# Patient Record
Sex: FEMALE | Race: AMERICAN INDIAN OR ALASKA NATIVE | NOT HISPANIC OR LATINO | Employment: FULL TIME | URBAN - METROPOLITAN AREA
[De-identification: names, ages, dates, MRNs, and addresses within clinical notes are randomized per-mention and may not be internally consistent; named-entity substitution may affect disease eponyms.]

---

## 2017-06-16 ENCOUNTER — APPOINTMENT (OUTPATIENT)
Dept: RADIOLOGY | Facility: MEDICAL CENTER | Age: 52
End: 2017-06-16
Attending: EMERGENCY MEDICINE
Payer: MEDICAID

## 2017-06-16 DIAGNOSIS — M23.51 RECURRENT RIGHT KNEE INSTABILITY: ICD-10-CM

## 2017-06-16 DIAGNOSIS — M25.561 RIGHT KNEE PAIN, UNSPECIFIED CHRONICITY: ICD-10-CM

## 2017-06-16 PROCEDURE — 73721 MRI JNT OF LWR EXTRE W/O DYE: CPT | Mod: RT

## 2018-04-09 ENCOUNTER — APPOINTMENT (OUTPATIENT)
Dept: RADIOLOGY | Facility: MEDICAL CENTER | Age: 53
End: 2018-04-09
Attending: FAMILY MEDICINE
Payer: MEDICAID

## 2018-04-11 ENCOUNTER — APPOINTMENT (OUTPATIENT)
Dept: RADIOLOGY | Facility: MEDICAL CENTER | Age: 53
End: 2018-04-11
Attending: FAMILY MEDICINE
Payer: MEDICAID

## 2018-04-12 ENCOUNTER — HOSPITAL ENCOUNTER (OUTPATIENT)
Dept: RADIOLOGY | Facility: MEDICAL CENTER | Age: 53
End: 2018-04-12
Attending: FAMILY MEDICINE
Payer: MEDICAID

## 2018-04-12 DIAGNOSIS — E05.90 PRETIBIAL MYXEDEMA: ICD-10-CM

## 2018-04-12 PROCEDURE — 76536 US EXAM OF HEAD AND NECK: CPT

## 2018-04-13 ENCOUNTER — OFFICE VISIT (OUTPATIENT)
Dept: ENDOCRINOLOGY | Facility: MEDICAL CENTER | Age: 53
End: 2018-04-13
Payer: MEDICAID

## 2018-04-13 VITALS
BODY MASS INDEX: 26.82 KG/M2 | WEIGHT: 161 LBS | DIASTOLIC BLOOD PRESSURE: 90 MMHG | SYSTOLIC BLOOD PRESSURE: 142 MMHG | HEART RATE: 94 BPM | HEIGHT: 65 IN | OXYGEN SATURATION: 96 %

## 2018-04-13 DIAGNOSIS — E53.8 VITAMIN B12 DEFICIENCY: ICD-10-CM

## 2018-04-13 DIAGNOSIS — E55.9 VITAMIN D DEFICIENCY: ICD-10-CM

## 2018-04-13 DIAGNOSIS — R00.2 HEART PALPITATIONS: ICD-10-CM

## 2018-04-13 DIAGNOSIS — E05.90 HYPERTHYROIDISM: ICD-10-CM

## 2018-04-13 PROCEDURE — 99204 OFFICE O/P NEW MOD 45 MIN: CPT | Performed by: INTERNAL MEDICINE

## 2018-04-13 RX ORDER — METHIMAZOLE 10 MG/1
40 TABLET ORAL DAILY
Qty: 360 TAB | Refills: 3 | Status: SHIPPED | OUTPATIENT
Start: 2018-04-13 | End: 2018-11-28 | Stop reason: SDUPTHER

## 2018-04-13 RX ORDER — METHIMAZOLE 10 MG/1
10 TABLET ORAL 3 TIMES DAILY
COMMUNITY
End: 2018-04-13 | Stop reason: SDUPTHER

## 2018-04-13 RX ORDER — METOPROLOL TARTRATE 100 MG/1
100 TABLET ORAL 2 TIMES DAILY
Qty: 180 TAB | Refills: 3 | Status: SHIPPED | OUTPATIENT
Start: 2018-04-13 | End: 2018-11-28 | Stop reason: SDUPTHER

## 2018-04-13 RX ORDER — PROPRANOLOL HYDROCHLORIDE 10 MG/1
10 TABLET ORAL 3 TIMES DAILY
COMMUNITY
End: 2018-04-13

## 2018-04-13 RX ORDER — HYDROCHLOROTHIAZIDE 25 MG/1
12.5 TABLET ORAL DAILY
COMMUNITY
End: 2021-04-30 | Stop reason: SDUPTHER

## 2018-04-13 RX ORDER — ERGOCALCIFEROL 1.25 MG/1
50000 CAPSULE ORAL
Qty: 18 CAP | Refills: 0 | Status: SHIPPED | OUTPATIENT
Start: 2018-04-13 | End: 2018-06-08

## 2018-04-16 NOTE — PROGRESS NOTES
New Patient Consult Note  Primary care physician: Edgar Garza M.D.    Reason for consult: Hyperthyroidism    HPI:  Raven Cotto is a 53 y.o. old patient who comes in today for evaluation of hyperthyroidism. She is currently on methimazole 10 mg 3 times daily and propranolol 10 mg 3 times daily. She is starting to feel much better and not having as many heart palpitations as before. She also had a lot of hand tremors which has resolved quite a bit.  Although she still complains of having fine hand tremors. Her unintentional weight loss has stopped now.     ROS:  Constitutional: No weight loss  Cardiac:  palpitations or racing heart  Resp: No shortness of breath  Neuro: fine hand tremors, No numbness or tinging in feet  Endo: No heat or cold intolerance, no polyuria or polydipsia  All other systems were reviewed and were negative.    Past Medical History:  There are no active problems to display for this patient.      Past Surgical History:  Past Surgical History:   Procedure Laterality Date   • KNEE ARTHROSCOPY Right 7/13/2017    Procedure: right knee arthroscopy, partial medial meniscectomy, chondroplasty of patella;  Surgeon: Riley Valladares M.D.;  Location: SURGERY [facility];  Service:    • OTHER ABDOMINAL SURGERY      gallbladder in 1992       Allergies:  Patient has no known allergies.    Social History:  Social History     Social History   • Marital status: Single     Spouse name: N/A   • Number of children: N/A   • Years of education: N/A     Occupational History   • Not on file.     Social History Main Topics   • Smoking status: Never Smoker   • Smokeless tobacco: Never Used   • Alcohol use No   • Drug use: No   • Sexual activity: Not on file     Other Topics Concern   • Not on file     Social History Narrative   • No narrative on file       Family History:  Family History   Problem Relation Age of Onset   • Allergies Mother    • Diabetes Mother    • Heart Disease Mother    • Heart Failure Mother   "  • Hyperlipidemia Mother    • Diabetes Sister    • Diabetes Brother        Medications:    Current Outpatient Prescriptions:   •  hydroCHLOROthiazide (HYDRODIURIL) 25 MG Tab, Take 12.5 mg by mouth every day., Disp: , Rfl:   •  vitamin D (CHOLECALCIFEROL) 1000 UNIT Tab, Take 1,000 Units by mouth every day., Disp: , Rfl:   •  methimazole (TAPAZOLE) 10 MG Tab, Take 4 Tabs by mouth every day., Disp: 360 Tab, Rfl: 3  •  metoprolol (LOPRESSOR) 100 MG Tab, Take 1 Tab by mouth 2 times a day., Disp: 180 Tab, Rfl: 3  •  vitamin D, Ergocalciferol, (DRISDOL) 82386 units Cap capsule, Take 1 Cap by mouth every 3 days for 56 days., Disp: 18 Cap, Rfl: 0  •  hydrOXYzine (ATARAX) 25 MG Tab, Take 1 Tab by mouth 3 times a day as needed (Nausea)., Disp: 15 Tab, Rfl: 0  •  docusate sodium (COLACE) 100 MG Cap, Take 1 Cap by mouth 2 times a day., Disp: 30 Cap, Rfl: 0  •  hydrocodone-acetaminophen (NORCO) 5-325 MG Tab per tablet, Take 1-2 Tabs by mouth every four hours as needed., Disp: 30 Tab, Rfl: 0  •  ibuprofen (MOTRIN) 800 MG TABS, Take 1 Tab by mouth every 8 hours as needed., Disp: 30 Tab, Rfl: 3    Labs: Reviewed    Physical Examination:  Vital signs: /90   Pulse 94   Ht 1.651 m (5' 5\")   Wt 73 kg (161 lb)   SpO2 96%   BMI 26.79 kg/m²  Body mass index is 26.79 kg/m².  General: No apparent distress, cooperative  Eyes: No scleral icterus or discharge  ENMT: Normal on external inspection of nose, lips, normal thyroid exam  Neck: No abnormal masses on inspection  Resp: Normal effort, clear to auscultation bilaterally   CVS: Regular rate and rhythm, S1 S2 normal, no murmur   Extremities: No edema  Abdomen: abdominal obesity present  Neuro: Alert and oriented  Skin: No rash  Psych: Normal mood and affect, intact memory and able to make informed decisions    Assessment and Plan:    1. Hyperthyroidism  Increase - methimazole (TAPAZOLE)  To 40 MG s by mouth every day.    - FREE THYROXINE; Future  - TSH; Future  - TRIIDOTHYRONINE; " Future  - T3 FREE; Future  - THYROTROPIN RECEP AB; Future    Stop propranolol.  Start  - metoprolol (LOPRESSOR) 100 MG Tab; Take 1 Tab by mouth 2 times a day.  Dispense: 180 Tab; Refill: 3    2. Vitamin D deficiency  Liz Ellen levels are low and she benefited from loading dose as per below.  - vitamin D, Ergocalciferol, (DRISDOL) 49753 units Cap capsule; Take 1 Cap by mouth every 3 days for 56 days.  Dispense: 18 Cap; Refill: 0    3. Vitamin B12 deficiency  Rule out Vitamin B12 deficiency .   - VITAMIN B12; Future    4. Heart palpitations  - metoprolol (LOPRESSOR) 100 MG Tab; Take 1 Tab by mouth 2 times a day.   Achieving euthyroidism will help.     Return in about 2 months (around 6/13/2018).     Thank you for allowing me to participate in the care of this patient.    Jet Khan M.D.  04/16/18    CC:   Edgar Garza M.D.    This note was created using voice recognition software (Dragon). The accuracy of the dictation is limited by the abilities of the software. I have reviewed the note prior to signing, however some errors in grammar and context are still possible. If you have any questions related to this note please do not hesitate to contact our office.

## 2018-11-28 ENCOUNTER — OFFICE VISIT (OUTPATIENT)
Dept: ENDOCRINOLOGY | Facility: MEDICAL CENTER | Age: 53
End: 2018-11-28
Payer: MEDICAID

## 2018-11-28 VITALS
SYSTOLIC BLOOD PRESSURE: 117 MMHG | HEART RATE: 53 BPM | WEIGHT: 160 LBS | BODY MASS INDEX: 26.66 KG/M2 | DIASTOLIC BLOOD PRESSURE: 68 MMHG | HEIGHT: 65 IN | OXYGEN SATURATION: 98 %

## 2018-11-28 DIAGNOSIS — R00.2 HEART PALPITATIONS: ICD-10-CM

## 2018-11-28 DIAGNOSIS — R53.83 FATIGUE, UNSPECIFIED TYPE: ICD-10-CM

## 2018-11-28 DIAGNOSIS — E05.90 HYPERTHYROIDISM: ICD-10-CM

## 2018-11-28 DIAGNOSIS — E55.9 VITAMIN D DEFICIENCY: ICD-10-CM

## 2018-11-28 DIAGNOSIS — Z79.899 HIGH RISK MEDICATION USE: ICD-10-CM

## 2018-11-28 PROCEDURE — 99214 OFFICE O/P EST MOD 30 MIN: CPT | Performed by: INTERNAL MEDICINE

## 2018-11-28 RX ORDER — METOPROLOL TARTRATE 100 MG/1
100 TABLET ORAL DAILY
Qty: 30 TAB | Refills: 3 | Status: SHIPPED | OUTPATIENT
Start: 2018-11-28

## 2018-11-28 RX ORDER — METHIMAZOLE 10 MG/1
10 TABLET ORAL DAILY
Qty: 90 TAB | Refills: 3 | Status: SHIPPED | OUTPATIENT
Start: 2018-11-28 | End: 2020-03-13

## 2018-11-28 NOTE — PROGRESS NOTES
Endocrinology Clinic Progress Note  PCP: Edgar Garza M.D.    HPI:  Raven Cotto is a 53 y.o. old patient who comes in today for review of endocrine problems.  Hyperthyroidism: After the most recent labs done on 24 October which showed hypothyroidism, her dose of methimazole was reduced from 40 mg daily to 30 mg daily.  She still continues to feel fatigued and tired although there is slight improvement after the dose reduction.    Vitamin D deficiency: Her levels are still low as per the most recent labs in October and since then she started taking 50,000 units once a week of vitamin D daily.    ROS:  Constitutional: Fatigue, no unintentional weight loss  Endo: Denies excessive thirst or frequent urination  All other systems were reviewed and were negative.    Past Medical History:  There are no active problems to display for this patient.      Medications:    Current Outpatient Prescriptions:   •  methimazole (TAPAZOLE) 10 MG Tab, Take 1 Tab by mouth every day., Disp: 90 Tab, Rfl: 3  •  metoprolol (LOPRESSOR) 100 MG Tab, Take 1 Tab by mouth every day., Disp: 30 Tab, Rfl: 3  •  hydroCHLOROthiazide (HYDRODIURIL) 25 MG Tab, Take 12.5 mg by mouth every day., Disp: , Rfl:   •  vitamin D (CHOLECALCIFEROL) 1000 UNIT Tab, Take 1,000 Units by mouth every day., Disp: , Rfl:   •  ibuprofen (MOTRIN) 800 MG TABS, Take 1 Tab by mouth every 8 hours as needed., Disp: 30 Tab, Rfl: 3  •  hydrOXYzine (ATARAX) 25 MG Tab, Take 1 Tab by mouth 3 times a day as needed (Nausea). (Patient not taking: Reported on 11/28/2018), Disp: 15 Tab, Rfl: 0  •  docusate sodium (COLACE) 100 MG Cap, Take 1 Cap by mouth 2 times a day. (Patient not taking: Reported on 11/28/2018), Disp: 30 Cap, Rfl: 0  •  hydrocodone-acetaminophen (NORCO) 5-325 MG Tab per tablet, Take 1-2 Tabs by mouth every four hours as needed. (Patient not taking: Reported on 11/28/2018), Disp: 30 Tab, Rfl: 0    Labs: Reviewed    Physical Examination:  Vital signs: /68  "(BP Location: Right arm, Patient Position: Sitting)   Pulse (!) 53   Ht 1.651 m (5' 5\")   Wt 72.6 kg (160 lb)   SpO2 98%   BMI 26.63 kg/m²  Body mass index is 26.63 kg/m².  General: No apparent distress, cooperative  Eyes: No scleral icterus, no discharge, normal eyelids  Neck: No abnormal masses on inspection, normal thyroid exam  Resp: Normal effort, clear to auscultation bilaterally  CVS: Regular rate and rhythm, S1 S2 normal, no murmur  Extremities: No lower extremity edema  Abdomen: abdominal obesity present  Musculoskeletal: Normal digits and nails  Skin: No rash on visible skin  Psych: Alert and oriented, normal mood and affect, intact memory and able to make informed decisions.    Assessment and Plan:    1. Hyperthyroidism  Reduce  - methimazole (TAPAZOLE) to 10 MG Tab; Take 1 Tab by mouth every day.     Reduce  - metoprolol (LOPRESSOR) to  100 MG Tab; Take 1 Tab by mouth every day.  -    2. Vitamin D deficiency  Continue 50,000 units once a week.    3. Heart palpitations  Much improved.  Can reduce metoprolol to 100 mg daily for now.    4. Fatigue, unspecified type  Rule out Vitamin B12 deficiency as the contributory factor for fatigue.    5. High risk medication use  Considering methimazole use, we will check CMP and CBC.  - COMP METABOLIC PANEL; Future    Return in about 2 months (around 1/28/2019).    Thank you for allowing me to participate in the care of this patient.    Jet Khan M.D.    CC:   Edgar Garza M.D.    This note was created using voice recognition software (Dragon). The accuracy of the dictation is limited by the abilities of the software. I have reviewed the note prior to signing, however some errors in grammar and context are still possible. If you have any questions related to this note please do not hesitate to contact our office.   "

## 2019-10-11 ENCOUNTER — TELEPHONE (OUTPATIENT)
Dept: ENDOCRINOLOGY | Facility: MEDICAL CENTER | Age: 54
End: 2019-10-11

## 2019-10-11 NOTE — TELEPHONE ENCOUNTER
Phone Number Called: [phone] (home)       Call outcome: left message for patient to call back regarding message below    Message: Called patient and left a message. There are no labs in epic for her upcoming appointment that were ordered by Dr. Khan. I wanted to know if the patient had the opportunity to have them done. I asked the patient to call us back.

## 2019-10-14 NOTE — PROGRESS NOTES
Endocrinology Clinic Progress Note  PCP: Edgar Garza M.D.    HPI:  Raven Cotto is a 54 y.o. old patient who comes in today for review of endocrine problems.    Hyperthyroidism:  Currently taking Methimazole 10 mg daily.    Vitamin D Deficiency:  Currently taking Vitamin D 50,000 units weekly.    Fatigue:    ROS:  Constitutional: No unintentional weight loss  Endo: Denies excessive thirst or frequent urination  All other systems were reviewed and were negative.    Past Medical History:  There are no active problems to display for this patient.      Medications:    Current Outpatient Medications:   •  methimazole (TAPAZOLE) 10 MG Tab, Take 1 Tab by mouth every day., Disp: 90 Tab, Rfl: 3  •  metoprolol (LOPRESSOR) 100 MG Tab, Take 1 Tab by mouth every day., Disp: 30 Tab, Rfl: 3  •  hydroCHLOROthiazide (HYDRODIURIL) 25 MG Tab, Take 12.5 mg by mouth every day., Disp: , Rfl:   •  vitamin D (CHOLECALCIFEROL) 1000 UNIT Tab, Take 1,000 Units by mouth every day., Disp: , Rfl:   •  hydrOXYzine (ATARAX) 25 MG Tab, Take 1 Tab by mouth 3 times a day as needed (Nausea). (Patient not taking: Reported on 11/28/2018), Disp: 15 Tab, Rfl: 0  •  docusate sodium (COLACE) 100 MG Cap, Take 1 Cap by mouth 2 times a day. (Patient not taking: Reported on 11/28/2018), Disp: 30 Cap, Rfl: 0  •  hydrocodone-acetaminophen (NORCO) 5-325 MG Tab per tablet, Take 1-2 Tabs by mouth every four hours as needed. (Patient not taking: Reported on 11/28/2018), Disp: 30 Tab, Rfl: 0  •  ibuprofen (MOTRIN) 800 MG TABS, Take 1 Tab by mouth every 8 hours as needed., Disp: 30 Tab, Rfl: 3    Labs: Reviewed    Physical Examination:  Vital signs: There were no vitals taken for this visit. There is no height or weight on file to calculate BMI.  General: No apparent distress, cooperative  Eyes: No scleral icterus, no discharge, normal eyelids  Neck: No abnormal masses on inspection, normal thyroid exam  Resp: Normal effort, clear to auscultation  bilaterally  CVS: Regular rate and rhythm, S1 S2 normal, no murmur  Extremities: No lower extremity edema  Abdomen: abdominal obesity present  Musculoskeletal: Normal digits and nails  Skin: No rash on visible skin  Psych: Alert and oriented, normal mood and affect, intact memory and able to make informed decisions.    Assessment and Plan:    1. Hyperthyroidism  ***    2. Vitamin D deficiency  ***      No follow-ups on file.    Total face to face time spent with patient equals 40 minutes. 25/40 minutes were spent on counseling the patient about the pathophysiology of pituitary-thyroid axis, pituitary-adrenal axis, pituitary-gonadal axis, natural history of thyroid nodules, side effects and benefits of thyroid hormone, testosterone, Vit D and Vit B12 replacement.    Thank you for allowing me to participate in the care of this patient.    Lexii Sheehan R.N.  This note was scribed by Lexii Sheehan RN, CDE  CC:   Edgar Garza M.D.    This note was created using voice recognition software (Dragon). The accuracy of the dictation is limited by the abilities of the software. I have reviewed the note prior to signing, however some errors in grammar and context are still possible. If you have any questions related to this note please do not hesitate to contact our office.

## 2019-10-15 ENCOUNTER — APPOINTMENT (OUTPATIENT)
Dept: ENDOCRINOLOGY | Facility: MEDICAL CENTER | Age: 54
End: 2019-10-15
Payer: MEDICAID

## 2020-02-11 ENCOUNTER — TELEPHONE (OUTPATIENT)
Dept: ENDOCRINOLOGY | Facility: MEDICAL CENTER | Age: 55
End: 2020-02-11

## 2020-02-12 DIAGNOSIS — Z79.899 HIGH RISK MEDICATION USE: ICD-10-CM

## 2020-02-12 DIAGNOSIS — E53.8 VITAMIN B12 DEFICIENCY: ICD-10-CM

## 2020-02-12 DIAGNOSIS — E05.90 HYPERTHYROIDISM: ICD-10-CM

## 2020-02-12 DIAGNOSIS — E55.9 VITAMIN D DEFICIENCY: ICD-10-CM

## 2020-02-12 NOTE — TELEPHONE ENCOUNTER
1. Caller Name: Raven Cotto                        Call Back Number: [phone] (home)         How would the patient prefer to be contacted with a response: Phone call OK to leave a detailed message    Patient called asking if she needs labs done for her appt next week Wednesday 19, patient asked to please fax them to:   [facility] in [place] Fax# [phone]    Please advise.

## 2020-02-12 NOTE — TELEPHONE ENCOUNTER
Phone Number Called:[phone] (home)       Call outcome: Spoke to patient regarding message below.    Message: Spoke to patient to let her know those labs were sent to fax number on file.

## 2020-02-18 PROBLEM — E55.9 VITAMIN D DEFICIENCY: Status: ACTIVE | Noted: 2020-02-18

## 2020-02-18 PROBLEM — E05.90 HYPERTHYROIDISM: Status: ACTIVE | Noted: 2020-02-18

## 2020-02-24 NOTE — PROGRESS NOTES
Endocrinology Clinic Progress Note  PCP: Edgar Garza M.D.    HPI:  Raven Cotto is a 55 y.o. old patient who comes in today for review of his hyperthyroid and Vitamin D deficiency.   She is currently on Methimazole 10 mg per day for her hyperthyroid.   ***  Vitamin D deficiency, currently on Vitamin D supplementation of 1000 iu per day          ROS:  Constitutional: No weight loss  Cardiac: No palpitations or racing heart  Resp: No shortness of breath  Neuro: No numbness or tinging in feet  Endo: No heat or cold intolerance, no polyuria or polydipsia  All other systems were reviewed and were negative.    Past Medical History:  Patient Active Problem List    Diagnosis Date Noted   • Hyperthyroidism 02/18/2020   • Vitamin D deficiency 02/18/2020       Past Surgical History:  Past Surgical History:   Procedure Laterality Date   • KNEE ARTHROSCOPY Right 7/13/2017    Procedure: right knee arthroscopy, partial medial meniscectomy, chondroplasty of patella;  Surgeon: Riley Valladares M.D.;  Location: SURGERY [facility];  Service:    • OTHER ABDOMINAL SURGERY      gallbladder in 1992       Allergies:  Patient has no known allergies.    Social History:  Social History     Socioeconomic History   • Marital status: Single     Spouse name: Not on file   • Number of children: Not on file   • Years of education: Not on file   • Highest education level: Not on file   Occupational History   • Not on file   Social Needs   • Financial resource strain: Not on file   • Food insecurity     Worry: Not on file     Inability: Not on file   • Transportation needs     Medical: Not on file     Non-medical: Not on file   Tobacco Use   • Smoking status: Never Smoker   • Smokeless tobacco: Never Used   Substance and Sexual Activity   • Alcohol use: No   • Drug use: No   • Sexual activity: Not on file   Lifestyle   • Physical activity     Days per week: Not on file     Minutes per session: Not on file   • Stress: Not on file    Relationships   • Social connections     Talks on phone: Not on file     Gets together: Not on file     Attends [employer] service: Not on file     Active member of club or organization: Not on file     Attends meetings of clubs or organizations: Not on file     Relationship status: Not on file   • Intimate partner violence     Fear of current or ex partner: Not on file     Emotionally abused: Not on file     Physically abused: Not on file     Forced sexual activity: Not on file   Other Topics Concern   • Not on file   Social History Narrative   • Not on file       Family History:  Family History   Problem Relation Age of Onset   • Allergies Mother    • Diabetes Mother    • Heart Disease Mother    • Heart Failure Mother    • Hyperlipidemia Mother    • Diabetes Sister    • Diabetes Brother        Medications:    Current Outpatient Medications:   •  methimazole (TAPAZOLE) 10 MG Tab, Take 1 Tab by mouth every day., Disp: 90 Tab, Rfl: 3  •  metoprolol (LOPRESSOR) 100 MG Tab, Take 1 Tab by mouth every day., Disp: 30 Tab, Rfl: 3  •  hydroCHLOROthiazide (HYDRODIURIL) 25 MG Tab, Take 12.5 mg by mouth every day., Disp: , Rfl:   •  vitamin D (CHOLECALCIFEROL) 1000 UNIT Tab, Take 1,000 Units by mouth every day., Disp: , Rfl:   •  hydrOXYzine (ATARAX) 25 MG Tab, Take 1 Tab by mouth 3 times a day as needed (Nausea). (Patient not taking: Reported on 11/28/2018), Disp: 15 Tab, Rfl: 0  •  docusate sodium (COLACE) 100 MG Cap, Take 1 Cap by mouth 2 times a day. (Patient not taking: Reported on 11/28/2018), Disp: 30 Cap, Rfl: 0  •  hydrocodone-acetaminophen (NORCO) 5-325 MG Tab per tablet, Take 1-2 Tabs by mouth every four hours as needed. (Patient not taking: Reported on 11/28/2018), Disp: 30 Tab, Rfl: 0  •  ibuprofen (MOTRIN) 800 MG TABS, Take 1 Tab by mouth every 8 hours as needed., Disp: 30 Tab, Rfl: 3    Labs: Reviewed    Physical Examination:  Vital signs: There were no vitals taken for this visit. There is no height or weight  on file to calculate BMI.  General: No apparent distress, cooperative  Eyes: No scleral icterus or discharge  ENMT: Normal on external inspection of nose, lips, normal thyroid exam  Neck: No abnormal masses on inspection  Resp: Normal effort, clear to auscultation bilaterally   CVS: Regular rate and rhythm, S1 S2 normal, no murmur   Extremities: No edema  Abdomen: abdominal obesity present  Neuro: Alert and oriented  Skin: No rash  Psych: Normal mood and affect, intact memory and able to make informed decisions    Assessment and Plan:  1. Hyperthyroidism  ***    2. Vitamin D deficiency  ***      No follow-ups on file.    Thank you for allowing me to participate in the care of this patient.    Katharina Santos R.N.  02/24/20    CC:   Edgar Garza M.D.    This note was created using voice recognition software (Dragon). The accuracy of the dictation is limited by the abilities of the software. I have reviewed the note prior to signing, however some errors in grammar and context are still possible. If you have any questions related to this note please do not hesitate to contact our office.

## 2020-02-26 ENCOUNTER — APPOINTMENT (OUTPATIENT)
Dept: ENDOCRINOLOGY | Facility: MEDICAL CENTER | Age: 55
End: 2020-02-26
Payer: OTHER GOVERNMENT

## 2020-03-13 ENCOUNTER — OFFICE VISIT (OUTPATIENT)
Dept: ENDOCRINOLOGY | Facility: MEDICAL CENTER | Age: 55
End: 2020-03-13
Payer: OTHER GOVERNMENT

## 2020-03-13 VITALS
OXYGEN SATURATION: 97 % | HEIGHT: 65 IN | SYSTOLIC BLOOD PRESSURE: 120 MMHG | WEIGHT: 172.8 LBS | HEART RATE: 65 BPM | BODY MASS INDEX: 28.79 KG/M2 | DIASTOLIC BLOOD PRESSURE: 78 MMHG

## 2020-03-13 DIAGNOSIS — Z79.899 HIGH RISK MEDICATION USE: ICD-10-CM

## 2020-03-13 DIAGNOSIS — R53.83 FATIGUE, UNSPECIFIED TYPE: ICD-10-CM

## 2020-03-13 DIAGNOSIS — R00.2 HEART PALPITATIONS: ICD-10-CM

## 2020-03-13 DIAGNOSIS — E55.9 VITAMIN D DEFICIENCY: ICD-10-CM

## 2020-03-13 DIAGNOSIS — E53.8 VITAMIN B12 DEFICIENCY: ICD-10-CM

## 2020-03-13 DIAGNOSIS — E05.90 HYPERTHYROIDISM: ICD-10-CM

## 2020-03-13 PROCEDURE — 99214 OFFICE O/P EST MOD 30 MIN: CPT | Performed by: INTERNAL MEDICINE

## 2020-03-13 RX ORDER — METHIMAZOLE 5 MG/1
5 TABLET ORAL DAILY
Qty: 90 TAB | Refills: 3 | Status: SHIPPED | OUTPATIENT
Start: 2020-03-13 | End: 2021-04-30 | Stop reason: SDUPTHER

## 2020-03-13 RX ORDER — ERGOCALCIFEROL 1.25 MG/1
50000 CAPSULE ORAL
Qty: 12 CAP | Refills: 3 | Status: SHIPPED | OUTPATIENT
Start: 2020-03-13 | End: 2021-04-30 | Stop reason: SDUPTHER

## 2020-03-17 NOTE — PROGRESS NOTES
Endocrinology Clinic Progress Note  PCP: Edgar Garza M.D.    HPI:  Raven Cotto is a 53 y.o. old patient who comes in today for review of endocrine problems.  Hyperthyroidism: Given 10 mg of methimazole daily.  Feels fine overall..    Vitamin D deficiency:taking 50,000 units once a week of vitamin D daily.    ROS:  Constitutional: Fatigue, no unintentional weight loss  Endo: Denies excessive thirst or frequent urination  All other systems were reviewed and were negative.    Past Medical History:  Patient Active Problem List    Diagnosis Date Noted   • Hyperthyroidism 02/18/2020   • Vitamin D deficiency 02/18/2020       Medications:    Current Outpatient Medications:   •  vitamin D, Ergocalciferol, (DRISDOL) 1.25 MG (86964 UT) Cap capsule, Take 1 Cap by mouth every 7 days. With food., Disp: 12 Cap, Rfl: 3  •  methimazole (TAPAZOLE) 5 MG Tab, Take 1 Tab by mouth every day., Disp: 90 Tab, Rfl: 3  •  metoprolol (LOPRESSOR) 100 MG Tab, Take 1 Tab by mouth every day., Disp: 30 Tab, Rfl: 3  •  hydroCHLOROthiazide (HYDRODIURIL) 25 MG Tab, Take 12.5 mg by mouth every day., Disp: , Rfl:   •  vitamin D (CHOLECALCIFEROL) 1000 UNIT Tab, Take 1,000 Units by mouth every day., Disp: , Rfl:   •  ibuprofen (MOTRIN) 800 MG TABS, Take 1 Tab by mouth every 8 hours as needed., Disp: 30 Tab, Rfl: 3  •  hydrOXYzine (ATARAX) 25 MG Tab, Take 1 Tab by mouth 3 times a day as needed (Nausea). (Patient not taking: Reported on 11/28/2018), Disp: 15 Tab, Rfl: 0  •  docusate sodium (COLACE) 100 MG Cap, Take 1 Cap by mouth 2 times a day. (Patient not taking: Reported on 11/28/2018), Disp: 30 Cap, Rfl: 0  •  hydrocodone-acetaminophen (NORCO) 5-325 MG Tab per tablet, Take 1-2 Tabs by mouth every four hours as needed. (Patient not taking: Reported on 11/28/2018), Disp: 30 Tab, Rfl: 0    Labs: Reviewed    Physical Examination:  Vital signs: /78 (BP Location: Left arm, Patient Position: Sitting, BP Cuff Size: Adult)   Pulse 65   Ht  "1.651 m (5' 5\")   Wt 78.4 kg (172 lb 12.8 oz)   SpO2 97%   BMI 28.76 kg/m²  Body mass index is 28.76 kg/m².  General: No apparent distress, cooperative  Eyes: No scleral icterus or discharge  ENMT: Normal on external inspection of nose, lips, normal thyroid exam  Neck: No abnormal masses on inspection  Resp: Normal effort, clear to auscultation bilaterally   CVS: Regular rate and rhythm, S1 S2 normal, no murmur   Extremities: No edema  Neuro: Alert and oriented  Skin: No rash  Psych: Normal mood and affect, intact memory and able to make informed decisions      Assessment and Plan:    1. Hyperthyroidism  Reduce  - methimazole (TAPAZOLE) to 5 MG Tab; Take 1 Tab by mouth every day.     Can stop metoprolol.     2. Vitamin D deficiency  Continue 50,000 units once a week.    3. Heart palpitations  Much improved.  Can stop metoprolol if she wishes to do so.    4.High risk medication use  Considering methimazole use, we will check CMP and CBC.  - COMP METABOLIC PANEL; Future    Return in about 3 months (around 6/13/2020).    Thank you for allowing me to participate in the care of this patient.    Jet Khan M.D.    CC:   Edgar Garza M.D.    This note was created using voice recognition software (Dragon). The accuracy of the dictation is limited by the abilities of the software. I have reviewed the note prior to signing, however some errors in grammar and context are still possible. If you have any questions related to this note please do not hesitate to contact our office.   "

## 2021-03-31 ENCOUNTER — TELEPHONE (OUTPATIENT)
Dept: ENDOCRINOLOGY | Facility: MEDICAL CENTER | Age: 56
End: 2021-03-31

## 2021-04-02 DIAGNOSIS — E05.90 HYPERTHYROIDISM: ICD-10-CM

## 2021-04-02 NOTE — TELEPHONE ENCOUNTER
Phone Number Called: [phone]    Call outcome: Spoke to patient regarding message below.    Message: Contacted patient to notify her that labs where at any [facility] lab. She stated she will go to [facility] to have labs done.

## 2021-04-29 ENCOUNTER — TELEPHONE (OUTPATIENT)
Dept: ENDOCRINOLOGY | Facility: MEDICAL CENTER | Age: 56
End: 2021-04-29

## 2021-04-29 ENCOUNTER — HOSPITAL ENCOUNTER (OUTPATIENT)
Dept: LAB | Facility: MEDICAL CENTER | Age: 56
End: 2021-04-29
Attending: NURSE PRACTITIONER
Payer: OTHER GOVERNMENT

## 2021-04-29 DIAGNOSIS — E05.90 HYPERTHYROIDISM: ICD-10-CM

## 2021-04-29 PROCEDURE — 84481 FREE ASSAY (FT-3): CPT

## 2021-04-29 PROCEDURE — 82306 VITAMIN D 25 HYDROXY: CPT

## 2021-04-29 PROCEDURE — 84443 ASSAY THYROID STIM HORMONE: CPT

## 2021-04-29 PROCEDURE — 82607 VITAMIN B-12: CPT

## 2021-04-29 PROCEDURE — 84439 ASSAY OF FREE THYROXINE: CPT

## 2021-04-29 PROCEDURE — 36415 COLL VENOUS BLD VENIPUNCTURE: CPT

## 2021-04-30 ENCOUNTER — OFFICE VISIT (OUTPATIENT)
Dept: ENDOCRINOLOGY | Facility: MEDICAL CENTER | Age: 56
End: 2021-04-30
Attending: NURSE PRACTITIONER
Payer: OTHER GOVERNMENT

## 2021-04-30 VITALS
HEART RATE: 69 BPM | WEIGHT: 179.5 LBS | DIASTOLIC BLOOD PRESSURE: 84 MMHG | OXYGEN SATURATION: 92 % | RESPIRATION RATE: 14 BRPM | SYSTOLIC BLOOD PRESSURE: 118 MMHG | HEIGHT: 65 IN | BODY MASS INDEX: 29.91 KG/M2

## 2021-04-30 DIAGNOSIS — E05.90 HYPERTHYROIDISM: ICD-10-CM

## 2021-04-30 DIAGNOSIS — E55.9 VITAMIN D DEFICIENCY: ICD-10-CM

## 2021-04-30 LAB
T3FREE SERPL-MCNC: 2.81 PG/ML (ref 2–4.4)
T4 FREE SERPL-MCNC: 0.85 NG/DL (ref 0.93–1.7)
TSH SERPL DL<=0.005 MIU/L-ACNC: 1.98 UIU/ML (ref 0.38–5.33)
VIT B12 SERPL-MCNC: 904 PG/ML (ref 211–911)

## 2021-04-30 PROCEDURE — 99214 OFFICE O/P EST MOD 30 MIN: CPT | Performed by: NURSE PRACTITIONER

## 2021-04-30 PROCEDURE — 99211 OFF/OP EST MAY X REQ PHY/QHP: CPT | Performed by: NURSE PRACTITIONER

## 2021-04-30 RX ORDER — HYDROCHLOROTHIAZIDE 25 MG/1
12.5 TABLET ORAL DAILY
Qty: 90 TABLET | Refills: 3 | Status: SHIPPED | OUTPATIENT
Start: 2021-04-30

## 2021-04-30 RX ORDER — ERGOCALCIFEROL 1.25 MG/1
50000 CAPSULE ORAL
Qty: 12 CAPSULE | Refills: 3 | Status: SHIPPED | OUTPATIENT
Start: 2021-04-30 | End: 2021-07-12 | Stop reason: SDUPTHER

## 2021-04-30 RX ORDER — METHIMAZOLE 10 MG/1
TABLET ORAL
COMMUNITY
Start: 2021-04-01

## 2021-04-30 RX ORDER — METHIMAZOLE 5 MG/1
5 TABLET ORAL DAILY
Qty: 90 TABLET | Refills: 3 | Status: SHIPPED | OUTPATIENT
Start: 2021-04-30 | End: 2021-05-13 | Stop reason: SDUPTHER

## 2021-04-30 ASSESSMENT — PATIENT HEALTH QUESTIONNAIRE - PHQ9: CLINICAL INTERPRETATION OF PHQ2 SCORE: 0

## 2021-04-30 NOTE — PROGRESS NOTES
Chief Complaint: Follow up for Hyperthyroidism.   Previous patient of Dr. Khan with last appointment on 2020.     HPI:     Raven Cotto is a very pleasant 56 y.o. female for continued evaluation & treatment of the followin.  Hyperthyroidism  Well since last visit patient reports feeling well.  Patient is currently on Methimazole 5mg QD for over a year, which is the last time she had an Endocrinology appointment. Patient continued to take metoprolol 100mg daily secondary to not being told to discontinue it after she was euthyroid.  She reports adequate compliance and deniesmissing any daily doses.       Weight is unchanged from prior appointment.     Patient denies palpitations, tremors, insomnia and increased anxiousness.  Patient does however reports feeling fatigued and like she has no energy with mental fogginess.     Ultrasound of neck 2018: Heterogeneous thyroid with hyperemia could relate to thyroiditis. No discrete thyroid nodule.       Ref. Range 2021 12:59   TSH Latest Ref Range: 0.380 - 5.330 uIU/mL 1.980   Free T-4 Latest Ref Range: 0.93 - 1.70 ng/dL 0.85 (L)   T3,Free Latest Ref Range: 2.00 - 4.40 pg/mL 2.81       2.  Vitamin D deficiency  Currently taking 50,000 units once a week of vitamin D daily.  No current vitamin D level.    Patient's medications, allergies, and social histories were reviewed and updated as appropriate.          ROS:     CONS:     No fever, no chills   EYES:     No diplopia, no blurry vision   CV:           No chest pain, no palpitations   PULM:     No SOB, no cough, no hemoptysis.   GI:            No nausea, no vomiting, no diarrhea, no constipation   ENDO:     No polyuria, no polydipsia, no heat intolerance, no cold intolerance       Past Medical History:  Problem List:  2020: Hyperthyroidism  2020: Vitamin D deficiency      Past Surgical History:  Past Surgical History:   Procedure Laterality Date   • KNEE ARTHROSCOPY Right  "7/13/2017    Procedure: right knee arthroscopy, partial medial meniscectomy, chondroplasty of patella;  Surgeon: Riley Valladares M.D.;  Location: SURGERY [facility];  Service:    • OTHER ABDOMINAL SURGERY      gallbladder in 1992        Allergies:  Patient has no known allergies.     Social History:  Social History     Tobacco Use   • Smoking status: Never Smoker   • Smokeless tobacco: Never Used   Substance Use Topics   • Alcohol use: No   • Drug use: No        Family History:   family history includes Allergies in her mother; Diabetes in her brother, mother, and sister; Heart Disease in her mother; Heart Failure in her mother; Hyperlipidemia in her mother.      PHYSICAL EXAM:   Vital signs: /84 (BP Location: Left arm, Patient Position: Sitting, BP Cuff Size: Adult)   Pulse 69   Resp 14   Ht 1.651 m (5' 5\")   Wt 81.4 kg (179 lb 8 oz)   SpO2 92%   BMI 29.87 kg/m²   GENERAL: Well-developed, well-nourished in no apparent distress.   EYE:  No ocular asymmetry, PERRLA, No exophthalmos or lid lag  HENT: Pink, moist mucous membranes.    NECK: No thyromegaly.   CARDIOVASCULAR:  No murmurs  LUNGS: Clear breath sounds  ABDOMEN: Soft, nontender   EXTREMITIES: No clubbing, cyanosis, or edema.   NEUROLOGICAL: No gross focal motor abnormalities, No visible tremors with both hands  LYMPH: No cervical adenopathy palpated.   SKIN: No rashes, lesions.     ASSESSMENT/PLAN:     1. Hyperthyroidism  Unstable.  Reduce methimazole to 2.5 mg daily.  Discontinue metoprolol 100 mg immediately.  Reviewed signs and symptoms of hyperthyroidism with patient.  Patient understands to contact provider if she has increased anxiousness, tremors, increased pain intolerance.     2. Vitamin D deficiency  Unstable.  Continue 50,000 units once a week.  We will have labs drawn to further assess deficiency.    3. Heart palpitations  Stable.  As per plan #1    4.High risk medication use  Stable.  As per plan #1    Repeat labs 1 week to next " appointment.  Next appointment in 3 months.  Thank you for allowing me to participate in the care of this patient.    LAWANDA Watts    CC:   Edgar Garza M.D.

## 2021-05-01 LAB — 25(OH)D3 SERPL-MCNC: 30 NG/ML (ref 30–80)

## 2021-05-13 DIAGNOSIS — E05.90 HYPERTHYROIDISM: ICD-10-CM

## 2021-05-13 RX ORDER — METHIMAZOLE 5 MG/1
5 TABLET ORAL DAILY
Qty: 90 TABLET | Refills: 3 | Status: SHIPPED | OUTPATIENT
Start: 2021-05-13

## 2021-07-12 DIAGNOSIS — E55.9 VITAMIN D DEFICIENCY: ICD-10-CM

## 2021-07-12 RX ORDER — ERGOCALCIFEROL 1.25 MG/1
50000 CAPSULE ORAL
Qty: 12 CAPSULE | Refills: 3 | Status: SHIPPED | OUTPATIENT
Start: 2021-07-12

## 2023-02-24 ENCOUNTER — APPOINTMENT (OUTPATIENT)
Dept: RADIOLOGY | Facility: MEDICAL CENTER | Age: 58
End: 2023-02-24
Attending: PHYSICIAN ASSISTANT
Payer: OTHER GOVERNMENT

## 2023-02-28 ENCOUNTER — HOSPITAL ENCOUNTER (OUTPATIENT)
Dept: RADIOLOGY | Facility: MEDICAL CENTER | Age: 58
End: 2023-02-28
Payer: OTHER GOVERNMENT

## 2023-03-02 ENCOUNTER — HOSPITAL ENCOUNTER (OUTPATIENT)
Dept: RADIOLOGY | Facility: MEDICAL CENTER | Age: 58
End: 2023-03-02
Attending: PHYSICIAN ASSISTANT
Payer: OTHER GOVERNMENT

## 2023-03-02 DIAGNOSIS — Z12.31 ENCOUNTER FOR MAMMOGRAM TO ESTABLISH BASELINE MAMMOGRAM: ICD-10-CM

## 2023-03-02 PROCEDURE — 77063 BREAST TOMOSYNTHESIS BI: CPT

## 2024-04-11 ENCOUNTER — APPOINTMENT (OUTPATIENT)
Dept: RADIOLOGY | Facility: IMAGING CENTER | Age: 59
End: 2024-04-11
Attending: PHYSICIAN ASSISTANT
Payer: COMMERCIAL

## 2024-04-11 ENCOUNTER — OFFICE VISIT (OUTPATIENT)
Dept: URGENT CARE | Facility: PHYSICIAN GROUP | Age: 59
End: 2024-04-11
Payer: COMMERCIAL

## 2024-04-11 VITALS
SYSTOLIC BLOOD PRESSURE: 128 MMHG | WEIGHT: 174 LBS | RESPIRATION RATE: 14 BRPM | HEART RATE: 96 BPM | HEIGHT: 65 IN | DIASTOLIC BLOOD PRESSURE: 74 MMHG | OXYGEN SATURATION: 98 % | TEMPERATURE: 98.8 F | BODY MASS INDEX: 28.99 KG/M2

## 2024-04-11 DIAGNOSIS — S66.911A STRAIN OF RIGHT WRIST, INITIAL ENCOUNTER: ICD-10-CM

## 2024-04-11 PROCEDURE — 73110 X-RAY EXAM OF WRIST: CPT | Mod: TC,FY,RT | Performed by: PHYSICIAN ASSISTANT

## 2024-04-11 PROCEDURE — 3074F SYST BP LT 130 MM HG: CPT | Performed by: PHYSICIAN ASSISTANT

## 2024-04-11 PROCEDURE — 3078F DIAST BP <80 MM HG: CPT | Performed by: PHYSICIAN ASSISTANT

## 2024-04-11 PROCEDURE — 99203 OFFICE O/P NEW LOW 30 MIN: CPT | Performed by: PHYSICIAN ASSISTANT

## 2024-04-11 ASSESSMENT — ENCOUNTER SYMPTOMS
SORE THROAT: 0
MYALGIAS: 0
VOMITING: 0
ABDOMINAL PAIN: 0
COUGH: 0
CONSTIPATION: 0
DIARRHEA: 0
HEADACHES: 0
NAUSEA: 0
EYE PAIN: 0
FEVER: 0
SHORTNESS OF BREATH: 0
CHILLS: 0

## 2024-04-12 NOTE — PROGRESS NOTES
"Subjective:   Raven Cotto is a 59 y.o. female who presents for Wrist Injury ((R), playing with granddaughter x 5 days ago when her hand got kicked back. Pain didn't start till 2 days ago)      59-year-old female describes a hyperextension injury of the right wrist when she was playing with her granddaughter 5 days ago.  She did not notice any immediate pain and felt fine for 2 days but with increased use she started having wrist pain.  Pain with specific movements especially ulnar deviation and extension.  She is right-hand dominant.    Review of Systems   Constitutional:  Negative for chills and fever.   HENT:  Negative for congestion, ear pain and sore throat.    Eyes:  Negative for pain.   Respiratory:  Negative for cough and shortness of breath.    Cardiovascular:  Negative for chest pain.   Gastrointestinal:  Negative for abdominal pain, constipation, diarrhea, nausea and vomiting.   Genitourinary:  Negative for dysuria.   Musculoskeletal:  Negative for myalgias.   Skin:  Negative for rash.   Neurological:  Negative for headaches.       Medications, Allergies, and current problem list reviewed today in Epic.     Objective:     /74   Pulse 96   Temp 37.1 °C (98.8 °F) (Temporal)   Resp 14   Ht 1.651 m (5' 5\")   Wt 78.9 kg (174 lb)   SpO2 98%     Physical Exam  Vitals reviewed.   Constitutional:       Appearance: Normal appearance.   HENT:      Head: Normocephalic and atraumatic.      Right Ear: External ear normal.      Left Ear: External ear normal.      Nose: Nose normal.      Mouth/Throat:      Mouth: Mucous membranes are moist.   Eyes:      Conjunctiva/sclera: Conjunctivae normal.   Cardiovascular:      Rate and Rhythm: Normal rate.   Pulmonary:      Effort: Pulmonary effort is normal.   Musculoskeletal:      Comments: Diffuse tenderness over ulnar aspect of the wrist not including point tenderness over the ulnar styloid.  No scaphoid tenderness.  Full range of motion, normal  " strength.  Pain with resisted ulnar deviation   Skin:     General: Skin is warm and dry.      Capillary Refill: Capillary refill takes less than 2 seconds.   Neurological:      Mental Status: She is alert and oriented to person, place, and time.         RADIOLOGY RESULTS   DX-WRIST-COMPLETE 3+ RIGHT    Result Date: 4/11/2024 4/11/2024 5:22 PM HISTORY/REASON FOR EXAM:  Pain/Deformity Following Trauma; right wrist hyperextension, ulnar aspect pain. TECHNIQUE/EXAM DESCRIPTION AND NUMBER OF VIEWS:  4 views of the RIGHT wrist. COMPARISON: None FINDINGS: No fracture, dislocation, osseous lesion or degenerative change. Nondisplaced carpal bone fracture may be occult at initial radiography. If there is trauma history and persistent pain in 7-10 days, recommend repeat radiographs to exclude occult fracture.     No fracture detected.           Assessment/Plan:     Diagnosis and associated orders:     1. Strain of right wrist, initial encounter  DX-WRIST-COMPLETE 3+ RIGHT         Comments/MDM:     Agree with radiology no evidence of fracture.  Low suspicion for occult fracture based on timing and fairly benign exam.  Discussed follow-up if symptoms persisting beyond 7 to 10 days.  Patient was placed in a wrist splint and recommend immobilization specially at night and can wean off of use of the wrist splint over the next several days once pain has improved.  Recommend regular range of motion exercises         Differential diagnosis, natural history, supportive care, and indications for immediate follow-up discussed.    Advised the patient to follow-up with the primary care physician for recheck, reevaluation, and consideration of further management.    Please note that this dictation was created using voice recognition software. I have made a reasonable attempt to correct obvious errors, but I expect that there are errors of grammar and possibly content that I did not discover before finalizing the note.    This note was  electronically signed by Chuck Nava PA-C

## 2024-10-18 ENCOUNTER — OFFICE VISIT (OUTPATIENT)
Dept: URGENT CARE | Facility: PHYSICIAN GROUP | Age: 59
End: 2024-10-18
Payer: COMMERCIAL

## 2024-10-18 ENCOUNTER — APPOINTMENT (OUTPATIENT)
Dept: RADIOLOGY | Facility: IMAGING CENTER | Age: 59
End: 2024-10-18
Attending: FAMILY MEDICINE
Payer: COMMERCIAL

## 2024-10-18 VITALS
WEIGHT: 170 LBS | HEIGHT: 65 IN | OXYGEN SATURATION: 98 % | BODY MASS INDEX: 28.32 KG/M2 | RESPIRATION RATE: 16 BRPM | HEART RATE: 98 BPM | DIASTOLIC BLOOD PRESSURE: 82 MMHG | SYSTOLIC BLOOD PRESSURE: 130 MMHG | TEMPERATURE: 97 F

## 2024-10-18 DIAGNOSIS — S92.512A CLOSED DISPLACED FRACTURE OF PROXIMAL PHALANX OF LESSER TOE OF LEFT FOOT, INITIAL ENCOUNTER: ICD-10-CM

## 2024-10-18 DIAGNOSIS — S90.129A CONTUSION OF FIFTH TOE: ICD-10-CM

## 2024-10-18 PROCEDURE — 99213 OFFICE O/P EST LOW 20 MIN: CPT | Performed by: FAMILY MEDICINE

## 2024-10-18 PROCEDURE — 3079F DIAST BP 80-89 MM HG: CPT | Performed by: FAMILY MEDICINE

## 2024-10-18 PROCEDURE — 3075F SYST BP GE 130 - 139MM HG: CPT | Performed by: FAMILY MEDICINE

## 2024-10-18 PROCEDURE — 73660 X-RAY EXAM OF TOE(S): CPT | Mod: TC,FY,RT | Performed by: RADIOLOGY

## 2024-11-21 ENCOUNTER — APPOINTMENT (OUTPATIENT)
Dept: RADIOLOGY | Facility: MEDICAL CENTER | Age: 59
End: 2024-11-21
Attending: NURSE PRACTITIONER
Payer: COMMERCIAL